# Patient Record
Sex: MALE | Race: WHITE
[De-identification: names, ages, dates, MRNs, and addresses within clinical notes are randomized per-mention and may not be internally consistent; named-entity substitution may affect disease eponyms.]

---

## 2018-05-04 ENCOUNTER — HOSPITAL ENCOUNTER (OUTPATIENT)
Dept: HOSPITAL 76 - DI.S | Age: 83
Discharge: HOME | End: 2018-05-04
Attending: INTERNAL MEDICINE
Payer: MEDICARE

## 2018-05-04 DIAGNOSIS — M54.5: Primary | ICD-10-CM

## 2018-05-04 DIAGNOSIS — M51.36: ICD-10-CM

## 2018-05-04 PROCEDURE — 72110 X-RAY EXAM L-2 SPINE 4/>VWS: CPT

## 2018-05-04 NOTE — XRAY REPORT
COMPLETE LUMBAR SPINE:  05/04/2018

 

CLINICAL INDICATION:  Back pain.

 

FINDINGS:  AP, lateral, and bilateral oblique views of the lumbar spine demonstrate extensive 

degenerative disk and facet disease.  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 

on L4.  There is no evidence of acute fracture.  The bowel gas pattern is normal.  Surgical 

clips from a cholecystectomy are incidentally noted.

 

IMPRESSION:  DEGENERATIVE CHANGES.  NO EVIDENCE OF FRACTURE.

 

 

DD: 05/04/2018 11:49

TD: 05/04/2018 13:15

Job #: [id number]